# Patient Record
Sex: FEMALE | Race: WHITE | NOT HISPANIC OR LATINO | Employment: FULL TIME | URBAN - METROPOLITAN AREA
[De-identification: names, ages, dates, MRNs, and addresses within clinical notes are randomized per-mention and may not be internally consistent; named-entity substitution may affect disease eponyms.]

---

## 2017-02-15 ENCOUNTER — HOSPITAL ENCOUNTER (OUTPATIENT)
Dept: FAMILY MEDICINE CLINIC | Facility: CLINIC | Age: 32
Setting detail: SPECIMEN
Discharge: HOME | End: 2017-02-15
Attending: FAMILY MEDICINE | Admitting: FAMILY MEDICINE

## 2017-02-15 LAB
ALBUMIN SERPL-MCNC: 3.5 G/DL (ref 3.5–4.8)
ALBUMIN/GLOB SERPL: 0.9 {RATIO} (ref 1–1.7)
ALP SERPL-CCNC: 62 IU/L (ref 32–91)
ALT SERPL-CCNC: 21 IU/L (ref 14–54)
ANION GAP SERPL CALC-SCNC: 10.6 MMOL/L (ref 10–20)
AST SERPL-CCNC: 29 IU/L (ref 15–41)
BASOPHILS # BLD AUTO: 0 10*3/UL (ref 0–0.2)
BASOPHILS NFR BLD AUTO: 1 % (ref 0–2)
BILIRUB SERPL-MCNC: 0.5 MG/DL (ref 0.3–1.2)
BUN SERPL-MCNC: 11 MG/DL (ref 8–20)
BUN/CREAT SERPL: 15.7 (ref 5.4–26.2)
CALCIUM SERPL-MCNC: 9.3 MG/DL (ref 8.9–10.3)
CHLORIDE SERPL-SCNC: 104 MMOL/L (ref 101–111)
CONV CO2: 30 MMOL/L (ref 22–32)
CONV TOTAL PROTEIN: 7.4 G/DL (ref 6.1–7.9)
CREAT UR-MCNC: 0.7 MG/DL (ref 0.4–1)
CRP SERPL-MCNC: 0.63 MG/DL (ref 0–0.7)
DIFFERENTIAL METHOD BLD: (no result)
EOSINOPHIL # BLD AUTO: 0.3 10*3/UL (ref 0–0.3)
EOSINOPHIL # BLD AUTO: 3 % (ref 0–3)
ERYTHROCYTE [DISTWIDTH] IN BLOOD BY AUTOMATED COUNT: 14.4 % (ref 11.5–14.5)
ERYTHROCYTE [SEDIMENTATION RATE] IN BLOOD BY WESTERGREN METHOD: 40 MM/HR (ref 0–20)
GLOBULIN UR ELPH-MCNC: 3.9 G/DL (ref 2.5–3.8)
GLUCOSE SERPL-MCNC: 99 MG/DL (ref 65–99)
HCT VFR BLD AUTO: 38.7 % (ref 35–49)
HGB BLD-MCNC: 12.7 G/DL (ref 12–15)
LYMPHOCYTES # BLD AUTO: 2 10*3/UL (ref 0.8–4.8)
LYMPHOCYTES NFR BLD AUTO: 20 % (ref 18–42)
MCH RBC QN AUTO: 28.5 PG (ref 26–32)
MCHC RBC AUTO-ENTMCNC: 32.7 G/DL (ref 32–36)
MCV RBC AUTO: 87.1 FL (ref 80–94)
MONOCYTES # BLD AUTO: 0.6 10*3/UL (ref 0.1–1.3)
MONOCYTES NFR BLD AUTO: 6 % (ref 2–11)
NEUTROPHILS # BLD AUTO: 7 10*3/UL (ref 2.3–8.6)
NEUTROPHILS NFR BLD AUTO: 70 % (ref 50–75)
NRBC BLD AUTO-RTO: 0 /100{WBCS}
NRBC/RBC NFR BLD MANUAL: 0 10*3/UL
PLATELET # BLD AUTO: 249 10*3/UL (ref 150–450)
PMV BLD AUTO: 10.3 FL (ref 7.4–10.4)
POTASSIUM SERPL-SCNC: 3.6 MMOL/L (ref 3.6–5.1)
RBC # BLD AUTO: 4.44 10*6/UL (ref 4–5.4)
SODIUM SERPL-SCNC: 141 MMOL/L (ref 136–144)
TSH SERPL-ACNC: 0.9 UIU/ML (ref 0.34–5.6)
VIT B12 SERPL-MCNC: 441 PG/ML (ref 180–914)
WBC # BLD AUTO: 9.9 10*3/UL (ref 4.5–11.5)

## 2017-02-17 LAB
ANA SER QL IA: NORMAL
CHROMATIN AB SERPL-ACNC: <20 [IU]/ML (ref 0–20)

## 2018-07-02 ENCOUNTER — HOSPITAL ENCOUNTER (OUTPATIENT)
Dept: FAMILY MEDICINE CLINIC | Facility: CLINIC | Age: 33
Setting detail: SPECIMEN
Discharge: HOME | End: 2018-07-02
Attending: FAMILY MEDICINE | Admitting: FAMILY MEDICINE

## 2018-07-02 LAB
ALBUMIN SERPL-MCNC: 3.5 G/DL (ref 3.5–4.8)
ALBUMIN/GLOB SERPL: 0.9 {RATIO} (ref 1–1.7)
ALP SERPL-CCNC: 59 IU/L (ref 32–91)
ALT SERPL-CCNC: 14 IU/L (ref 14–54)
ANION GAP SERPL CALC-SCNC: 8.9 MMOL/L (ref 10–20)
AST SERPL-CCNC: 18 IU/L (ref 15–41)
BASOPHILS # BLD AUTO: 0 10*3/UL (ref 0–0.2)
BASOPHILS NFR BLD AUTO: 0 % (ref 0–2)
BILIRUB SERPL-MCNC: 0.6 MG/DL (ref 0.3–1.2)
BUN SERPL-MCNC: 10 MG/DL (ref 8–20)
BUN/CREAT SERPL: 16.7 (ref 5.4–26.2)
CALCIUM SERPL-MCNC: 9.1 MG/DL (ref 8.9–10.3)
CHLORIDE SERPL-SCNC: 102 MMOL/L (ref 101–111)
CHOLEST SERPL-MCNC: 116 MG/DL
CHOLEST/HDLC SERPL: 2.5 {RATIO}
CONV CO2: 28 MMOL/L (ref 22–32)
CONV LDL CHOLESTEROL DIRECT: 48 MG/DL (ref 0–100)
CONV TOTAL PROTEIN: 7.6 G/DL (ref 6.1–7.9)
CREAT UR-MCNC: 0.6 MG/DL (ref 0.4–1)
DIFFERENTIAL METHOD BLD: (no result)
EOSINOPHIL # BLD AUTO: 0.2 10*3/UL (ref 0–0.3)
EOSINOPHIL # BLD AUTO: 2 % (ref 0–3)
ERYTHROCYTE [DISTWIDTH] IN BLOOD BY AUTOMATED COUNT: 13.3 % (ref 11.5–14.5)
ERYTHROCYTE [SEDIMENTATION RATE] IN BLOOD BY WESTERGREN METHOD: 45 MM/HR (ref 0–20)
GLOBULIN UR ELPH-MCNC: 4.1 G/DL (ref 2.5–3.8)
GLUCOSE SERPL-MCNC: 83 MG/DL (ref 65–99)
HCT VFR BLD AUTO: 39.1 % (ref 35–49)
HDLC SERPL-MCNC: 46 MG/DL
HGB BLD-MCNC: 12.9 G/DL (ref 12–15)
LDLC/HDLC SERPL: 1 {RATIO}
LIPID INTERPRETATION: ABNORMAL
LYMPHOCYTES # BLD AUTO: 1.4 10*3/UL (ref 0.8–4.8)
LYMPHOCYTES NFR BLD AUTO: 16 % (ref 18–42)
MCH RBC QN AUTO: 29.5 PG (ref 26–32)
MCHC RBC AUTO-ENTMCNC: 32.9 G/DL (ref 32–36)
MCV RBC AUTO: 89.5 FL (ref 80–94)
MONOCYTES # BLD AUTO: 0.4 10*3/UL (ref 0.1–1.3)
MONOCYTES NFR BLD AUTO: 5 % (ref 2–11)
NEUTROPHILS # BLD AUTO: 6.6 10*3/UL (ref 2.3–8.6)
NEUTROPHILS NFR BLD AUTO: 77 % (ref 50–75)
NRBC BLD AUTO-RTO: 0 /100{WBCS}
NRBC/RBC NFR BLD MANUAL: 0 10*3/UL
PLATELET # BLD AUTO: 235 10*3/UL (ref 150–450)
PMV BLD AUTO: 9.6 FL (ref 7.4–10.4)
POTASSIUM SERPL-SCNC: 3.9 MMOL/L (ref 3.6–5.1)
RBC # BLD AUTO: 4.36 10*6/UL (ref 4–5.4)
SODIUM SERPL-SCNC: 135 MMOL/L (ref 136–144)
TRIGL SERPL-MCNC: 113 MG/DL
VLDLC SERPL CALC-MCNC: 21.8 MG/DL
WBC # BLD AUTO: 8.6 10*3/UL (ref 4.5–11.5)

## 2018-09-11 ENCOUNTER — LAB (OUTPATIENT)
Dept: LAB | Facility: HOSPITAL | Age: 33
End: 2018-09-11

## 2018-09-11 ENCOUNTER — OFFICE VISIT (OUTPATIENT)
Dept: ORTHOPEDIC SURGERY | Facility: CLINIC | Age: 33
End: 2018-09-11

## 2018-09-11 VITALS — WEIGHT: 125 LBS | HEIGHT: 63 IN | BODY MASS INDEX: 22.15 KG/M2 | TEMPERATURE: 99 F

## 2018-09-11 DIAGNOSIS — M25.561 CHRONIC PAIN OF RIGHT KNEE: Primary | ICD-10-CM

## 2018-09-11 DIAGNOSIS — M25.561 CHRONIC PAIN OF RIGHT KNEE: ICD-10-CM

## 2018-09-11 DIAGNOSIS — G89.29 CHRONIC PAIN OF RIGHT KNEE: Primary | ICD-10-CM

## 2018-09-11 DIAGNOSIS — G89.29 CHRONIC PAIN OF RIGHT KNEE: ICD-10-CM

## 2018-09-11 LAB
CHROMATIN AB SERPL-ACNC: <10 IU/ML (ref 0–14)
ERYTHROCYTE [SEDIMENTATION RATE] IN BLOOD: 31 MM/HR (ref 0–20)

## 2018-09-11 PROCEDURE — 86431 RHEUMATOID FACTOR QUANT: CPT | Performed by: ORTHOPAEDIC SURGERY

## 2018-09-11 PROCEDURE — 99203 OFFICE O/P NEW LOW 30 MIN: CPT | Performed by: ORTHOPAEDIC SURGERY

## 2018-09-11 PROCEDURE — 73562 X-RAY EXAM OF KNEE 3: CPT | Performed by: ORTHOPAEDIC SURGERY

## 2018-09-11 PROCEDURE — 36415 COLL VENOUS BLD VENIPUNCTURE: CPT

## 2018-09-11 PROCEDURE — 85651 RBC SED RATE NONAUTOMATED: CPT

## 2018-09-11 RX ORDER — LOPERAMIDE HYDROCHLORIDE 2 MG/1
2 TABLET ORAL 4 TIMES DAILY PRN
COMMUNITY

## 2018-09-11 NOTE — PROGRESS NOTES
"Patient: Crystal RANDOLPH  YOB: 1985 33 y.o. female  Medical Record Number: [id number]    Chief Complaints:   Chief Complaint   Patient presents with   • Right Knee - Establish Care       History of Present Illness:Crystal RANDOLPH is a 33 y.o. female who presents with complaints of right knee pain and swelling which is significant.  She has had these symptoms for nearly 4 years and the symptoms are fairly intermittent.  At times she will have severe pain and difficulty bending the kneeand at other times it will calm down to the point where she is able to function fairly normal.  This has worsened over the last few months to the point where the severe symptoms are quite severe.  She denies other constitutional symptoms.  She does have history of Crohn's disease and is on medication for this    Allergies:   Allergies   Allergen Reactions   • Remicade [Infliximab] Anaphylaxis       Medications:   Current Outpatient Prescriptions   Medication Sig Dispense Refill   • loperamide (LOPERAMIDE A-D) 2 MG tablet Take 2 mg by mouth 4 (Four) Times a Day As Needed for Diarrhea.     • Multiple Vitamins-Minerals (MULTI COMPLETE PO) Take  by mouth.       No current facility-administered medications for this visit.          The following portions of the patient's history were reviewed and updated as appropriate: allergies, current medications, past family history, past medical history, past social history, past surgical history and problem list.    Review of Systems:   A 14 point review of systems was performed. All systems negative except pertinent positives/negative listed in HPI above    Physical Exam:   Vitals:    09/11/18 0847   Temp: 99 °F (37.2 °C)   Weight: 56.7 kg (125 lb)   Height: 160 cm (63\")       General: A and O x 3, ASA, NAD    SCLERA:    Normal    DENTITION:   Normal  Knee:  right    ALIGNMENT:     Neutral  ,   Patella tracks   midline    GAIT:    Antalgic    SKIN:    No abnormality    RANGE OF MOTION:   " Painful flexion    STRENGTH:   5 / 5    LIGAMENTS:    No varus / valgus instability.   Negative  Lachman.    MENISCUS:     Positive  medial   Duane       DISTAL PULSES:    Paplable    DISTAL SENSATION :   Intact    LYMPHATICS:     No   lymphadenopathy    OTHER:          - Positive 2+ effusion      - No crepitance with ROM          Radiology:  Xrays 3views right knee  (ap,lateral, sunrise) were ordered and reviewed for evaluation of knee pain demonstrating no abnormality. There are no previous films for comparision.    Assessment/Plan: chronic intermittent swelling and pain in the right knee.  She has a large effusion today.  She also upon questioning stated she had a history of some right wrist soreness which has come and gone.  I'm concerned that she could have a rheumatologic issue at play.  I am going to get an MRI of her knee to rule out mechanical issues.  We'll also order some basic blood work including sedimentation rate and rheumatoid factor and C-reactive protein.  If this workup is negative for mechanical issues within the knee I would send her to a rheumatologist for further evaluation.  She will call back after the MRI and blood work have been performed      Berny Schilling MD  9/11/2018

## 2018-09-19 ENCOUNTER — HOSPITAL ENCOUNTER (OUTPATIENT)
Dept: MRI IMAGING | Facility: HOSPITAL | Age: 33
Discharge: HOME | End: 2018-09-19
Attending: ORTHOPAEDIC SURGERY | Admitting: ORTHOPAEDIC SURGERY

## 2018-09-19 DIAGNOSIS — M25.561 CHRONIC PAIN OF RIGHT KNEE: ICD-10-CM

## 2018-09-19 DIAGNOSIS — G89.29 CHRONIC PAIN OF RIGHT KNEE: ICD-10-CM

## 2018-09-19 PROCEDURE — 73721 MRI JNT OF LWR EXTRE W/O DYE: CPT

## 2018-09-28 ENCOUNTER — TELEPHONE (OUTPATIENT)
Dept: ORTHOPEDIC SURGERY | Facility: CLINIC | Age: 33
End: 2018-09-28

## 2018-09-28 NOTE — TELEPHONE ENCOUNTER
Spoke with patient about MRI results which showed a lot of swelling and about her Labs which were okay as per MLL.  I transferred her to [facility] Kim to set up follow up with [facility] to discuss options

## 2018-10-02 ENCOUNTER — OFFICE VISIT (OUTPATIENT)
Dept: ORTHOPEDIC SURGERY | Facility: CLINIC | Age: 33
End: 2018-10-02

## 2018-10-02 VITALS — HEIGHT: 63 IN | TEMPERATURE: 99 F | BODY MASS INDEX: 22.15 KG/M2 | WEIGHT: 125 LBS

## 2018-10-02 DIAGNOSIS — M25.561 ACUTE PAIN OF RIGHT KNEE: Primary | ICD-10-CM

## 2018-10-02 PROCEDURE — 99213 OFFICE O/P EST LOW 20 MIN: CPT | Performed by: ORTHOPAEDIC SURGERY

## 2018-10-02 NOTE — PROGRESS NOTES
"Patient: Crystal RANDOLPH  YOB: 1985 33 y.o. female  Medical Record Number: [id number]    Chief Complaints:   Chief Complaint   Patient presents with   • Right Knee - Follow-up       History of Present Illness:Crystal RANDOLPH is a 33 y.o. female who presents for follow-up of  right knee MRI she has had a persistent chronic effusion for about 4 years.  She denies any known trauma.  She denies any mechanical symptoms.  The knee is achy or sore she rates as a 3 out of 10.    Allergies:   Allergies   Allergen Reactions   • Remicade [Infliximab] Anaphylaxis       Medications:   Current Outpatient Prescriptions   Medication Sig Dispense Refill   • loperamide (LOPERAMIDE A-D) 2 MG tablet Take 2 mg by mouth 4 (Four) Times a Day As Needed for Diarrhea.     • Multiple Vitamins-Minerals (MULTI COMPLETE PO) Take  by mouth.       No current facility-administered medications for this visit.          The following portions of the patient's history were reviewed and updated as appropriate: allergies, current medications, past family history, past medical history, past social history, past surgical history and problem list.    Review of Systems:   A 14 point review of systems was performed. All systems negative except pertinent positives/negative listed in HPI above    Physical Exam:   Vitals:    10/02/18 1445   Temp: 99 °F (37.2 °C)   TempSrc: Temporal Artery    Weight: 56.7 kg (125 lb)   Height: 160 cm (63\")       General: A and O x 3, ASA, NAD    SCLERA:    Normal    DENTITION:   Normal  Knee shows a 2+ effusion she has good flexion and extension there is no instability there is no significant crepitus with motion the skin is normal appearance the calf is soft is intact to light touch with palpable distal pulses    Radiology: I reviewed her MRI of the right knee both films and report which show what appears to be inflammatory synovitis with synovial thickening and a large effusion but without obvious mechanical " problems    Assessment/Plan:  chronic effusion with MRI documented synovitis without mechanical issues.  I'm going to send her to rheumatology.  I certainly would be happy see her back down the road should we need to consider a synovectomy but we would like to pursueconservative options prior to that.

## 2019-07-06 ENCOUNTER — HOSPITAL ENCOUNTER (OUTPATIENT)
Dept: LABOR AND DELIVERY | Facility: HOSPITAL | Age: 34
Discharge: HOME | End: 2019-07-06
Admitting: OBSTETRICS & GYNECOLOGY

## 2019-07-06 ENCOUNTER — LAB (OUTPATIENT)
Dept: LAB | Facility: HOSPITAL | Age: 34
End: 2019-07-06

## 2019-07-06 DIAGNOSIS — Z98.891 HX OF CESAREAN SECTION: ICD-10-CM

## 2019-07-06 DIAGNOSIS — Z98.891 HX OF CESAREAN SECTION: Primary | ICD-10-CM

## 2019-07-06 LAB
ABO GROUP BLD: NORMAL
AMPHET+METHAMPHET UR QL: NEGATIVE
BARBITURATES UR QL SCN: NORMAL
BENZODIAZ UR QL SCN: NEGATIVE
BLD GP AB SCN SERPL QL: NEGATIVE
CANNABINOIDS SERPL QL: NEGATIVE
COCAINE UR QL: NEGATIVE
CREAT UR-MCNC: 162.5 MG/DL
DEPRECATED RDW RBC AUTO: 65.6 FL (ref 37–54)
EOSINOPHIL # BLD MANUAL: 0.19 10*3/MM3 (ref 0–0.4)
EOSINOPHIL NFR BLD MANUAL: 2 % (ref 0.3–6.2)
ERYTHROCYTE [DISTWIDTH] IN BLOOD BY AUTOMATED COUNT: 21.8 % (ref 12.3–15.4)
HCT VFR BLD AUTO: 36.4 % (ref 34–46.6)
HGB BLD-MCNC: 12.2 G/DL (ref 12–15.9)
HIV1+2 AB SER QL: NEGATIVE
LYMPHOCYTES # BLD MANUAL: 1.15 10*3/MM3 (ref 0.7–3.1)
LYMPHOCYTES NFR BLD MANUAL: 1 % (ref 5–12)
LYMPHOCYTES NFR BLD MANUAL: 12 % (ref 19.6–45.3)
MCH RBC QN AUTO: 29.7 PG (ref 26.6–33)
MCHC RBC AUTO-ENTMCNC: 33.4 G/DL (ref 31.5–35.7)
MCV RBC AUTO: 88.9 FL (ref 79–97)
METHADONE UR QL SCN: NEGATIVE
MONOCYTES # BLD AUTO: 0.1 10*3/MM3 (ref 0.1–0.9)
NEUTROPHILS # BLD AUTO: 8.16 10*3/MM3 (ref 1.7–7)
NEUTROPHILS NFR BLD MANUAL: 80 % (ref 42.7–76)
NEUTS BAND NFR BLD MANUAL: 5 % (ref 0–5)
OPIATES UR QL: NEGATIVE
PCP UR QL SCN: NEGATIVE
PLAT MORPH BLD: NORMAL
PLATELET # BLD AUTO: 172 10*3/MM3 (ref 140–450)
PMV BLD AUTO: 9.2 FL (ref 6–12)
POLYCHROMASIA BLD QL SMEAR: ABNORMAL
RBC # BLD AUTO: 4.1 10*6/MM3 (ref 3.77–5.28)
RH BLD: POSITIVE
SCAN SLIDE: NORMAL
T&S EXPIRATION DATE: NORMAL
WBC MORPH BLD: NORMAL
WBC NRBC COR # BLD: 9.6 10*3/MM3 (ref 3.4–10.8)

## 2019-07-06 PROCEDURE — 36415 COLL VENOUS BLD VENIPUNCTURE: CPT

## 2019-07-06 PROCEDURE — 80307 DRUG TEST PRSMV CHEM ANLYZR: CPT

## 2019-07-06 PROCEDURE — 86901 BLOOD TYPING SEROLOGIC RH(D): CPT | Performed by: OBSTETRICS & GYNECOLOGY

## 2019-07-06 PROCEDURE — 86900 BLOOD TYPING SEROLOGIC ABO: CPT | Performed by: OBSTETRICS & GYNECOLOGY

## 2019-07-06 PROCEDURE — 86850 RBC ANTIBODY SCREEN: CPT | Performed by: OBSTETRICS & GYNECOLOGY

## 2019-07-06 PROCEDURE — 86900 BLOOD TYPING SEROLOGIC ABO: CPT

## 2019-07-06 PROCEDURE — 85007 BL SMEAR W/DIFF WBC COUNT: CPT

## 2019-07-06 PROCEDURE — G0432 EIA HIV-1/HIV-2 SCREEN: HCPCS

## 2019-07-06 PROCEDURE — 86901 BLOOD TYPING SEROLOGIC RH(D): CPT

## 2019-07-06 PROCEDURE — 85025 COMPLETE CBC W/AUTO DIFF WBC: CPT

## 2019-07-06 PROCEDURE — 82570 ASSAY OF URINE CREATININE: CPT

## 2019-07-06 PROCEDURE — 86780 TREPONEMA PALLIDUM: CPT

## 2019-07-08 ENCOUNTER — APPOINTMENT (OUTPATIENT)
Dept: CT IMAGING | Facility: HOSPITAL | Age: 34
End: 2019-07-08

## 2019-07-08 ENCOUNTER — ANESTHESIA (OUTPATIENT)
Dept: LABOR AND DELIVERY | Facility: HOSPITAL | Age: 34
End: 2019-07-08

## 2019-07-08 ENCOUNTER — HOSPITAL ENCOUNTER (INPATIENT)
Facility: HOSPITAL | Age: 34
LOS: 3 days | Discharge: HOME | End: 2019-07-11
Attending: OBSTETRICS & GYNECOLOGY | Admitting: OBSTETRICS & GYNECOLOGY

## 2019-07-08 ENCOUNTER — ANESTHESIA EVENT (OUTPATIENT)
Dept: LABOR AND DELIVERY | Facility: HOSPITAL | Age: 34
End: 2019-07-08

## 2019-07-08 PROCEDURE — 74178 CT ABD&PLV WO CNTR FLWD CNTR: CPT

## 2019-07-08 PROCEDURE — 25010000002 PHENYLEPHRINE 10 MG/ML SOLUTION: Performed by: ANESTHESIOLOGY

## 2019-07-08 PROCEDURE — 25010000003 CEFAZOLIN PER 500 MG: Performed by: OBSTETRICS & GYNECOLOGY

## 2019-07-08 PROCEDURE — 25010000002 ONDANSETRON PER 1 MG: Performed by: ANESTHESIOLOGY

## 2019-07-08 PROCEDURE — 94799 UNLISTED PULMONARY SVC/PX: CPT

## 2019-07-08 PROCEDURE — 25010000002 MORPHINE PER 10 MG: Performed by: ANESTHESIOLOGY

## 2019-07-08 PROCEDURE — 0 IOPAMIDOL PER 1 ML: Performed by: OBSTETRICS & GYNECOLOGY

## 2019-07-08 PROCEDURE — 25010000002 KETOROLAC TROMETHAMINE PER 15 MG: Performed by: ANESTHESIOLOGY

## 2019-07-08 RX ORDER — PROMETHAZINE HYDROCHLORIDE 25 MG/1
25 SUPPOSITORY RECTAL ONCE AS NEEDED
Status: DISCONTINUED | OUTPATIENT
Start: 2019-07-08 | End: 2019-07-11 | Stop reason: HOSPADM

## 2019-07-08 RX ORDER — METHYLERGONOVINE MALEATE 0.2 MG/ML
200 INJECTION INTRAVENOUS ONCE AS NEEDED
Status: DISCONTINUED | OUTPATIENT
Start: 2019-07-08 | End: 2019-07-11 | Stop reason: HOSPADM

## 2019-07-08 RX ORDER — KETOROLAC TROMETHAMINE 30 MG/ML
30 INJECTION, SOLUTION INTRAMUSCULAR; INTRAVENOUS EVERY 6 HOURS PRN
Status: DISPENSED | OUTPATIENT
Start: 2019-07-08 | End: 2019-07-09

## 2019-07-08 RX ORDER — PRENATAL VIT/IRON FUM/FOLIC AC 27MG-0.8MG
1 TABLET ORAL DAILY
Status: DISCONTINUED | OUTPATIENT
Start: 2019-07-08 | End: 2019-07-11 | Stop reason: HOSPADM

## 2019-07-08 RX ORDER — MORPHINE SULFATE 1 MG/ML
INJECTION, SOLUTION EPIDURAL; INTRATHECAL; INTRAVENOUS AS NEEDED
Status: DISCONTINUED | OUTPATIENT
Start: 2019-07-08 | End: 2019-07-08

## 2019-07-08 RX ORDER — LIDOCAINE HYDROCHLORIDE 10 MG/ML
5 INJECTION, SOLUTION EPIDURAL; INFILTRATION; INTRACAUDAL; PERINEURAL AS NEEDED
Status: DISCONTINUED | OUTPATIENT
Start: 2019-07-08 | End: 2019-07-08 | Stop reason: HOSPADM

## 2019-07-08 RX ORDER — ACETAMINOPHEN 325 MG/1
650 TABLET ORAL EVERY 4 HOURS PRN
Status: ACTIVE | OUTPATIENT
Start: 2019-07-08 | End: 2019-07-09

## 2019-07-08 RX ORDER — DEXAMETHASONE SODIUM PHOSPHATE 4 MG/ML
8 INJECTION, SOLUTION INTRA-ARTICULAR; INTRALESIONAL; INTRAMUSCULAR; INTRAVENOUS; SOFT TISSUE ONCE AS NEEDED
Status: DISCONTINUED | OUTPATIENT
Start: 2019-07-08 | End: 2019-07-11 | Stop reason: HOSPADM

## 2019-07-08 RX ORDER — OXYTOCIN-SODIUM CHLORIDE 0.9% IV SOLN 30 UNIT/500ML 30-0.9/5 UT/ML-%
999 SOLUTION INTRAVENOUS ONCE
Status: DISCONTINUED | OUTPATIENT
Start: 2019-07-08 | End: 2019-07-11 | Stop reason: HOSPADM

## 2019-07-08 RX ORDER — MORPHINE SULFATE 4 MG/ML
2 INJECTION, SOLUTION INTRAMUSCULAR; INTRAVENOUS
Status: ACTIVE | OUTPATIENT
Start: 2019-07-08 | End: 2019-07-09

## 2019-07-08 RX ORDER — BUPIVACAINE HYDROCHLORIDE 7.5 MG/ML
INJECTION, SOLUTION EPIDURAL; RETROBULBAR
Status: DISCONTINUED | OUTPATIENT
Start: 2019-07-08 | End: 2019-07-08 | Stop reason: SURG

## 2019-07-08 RX ORDER — OXYCODONE HYDROCHLORIDE 5 MG/1
10 TABLET ORAL EVERY 4 HOURS PRN
Status: DISCONTINUED | OUTPATIENT
Start: 2019-07-08 | End: 2019-07-11 | Stop reason: HOSPADM

## 2019-07-08 RX ORDER — SODIUM CHLORIDE, SODIUM LACTATE, POTASSIUM CHLORIDE, CALCIUM CHLORIDE 600; 310; 30; 20 MG/100ML; MG/100ML; MG/100ML; MG/100ML
125 INJECTION, SOLUTION INTRAVENOUS CONTINUOUS
Status: DISCONTINUED | OUTPATIENT
Start: 2019-07-08 | End: 2019-07-08

## 2019-07-08 RX ORDER — ACETAMINOPHEN 650 MG/1
650 SUPPOSITORY RECTAL ONCE AS NEEDED
Status: DISCONTINUED | OUTPATIENT
Start: 2019-07-08 | End: 2019-07-11 | Stop reason: HOSPADM

## 2019-07-08 RX ORDER — TRISODIUM CITRATE DIHYDRATE AND CITRIC ACID MONOHYDRATE 500; 334 MG/5ML; MG/5ML
30 SOLUTION ORAL ONCE
Status: COMPLETED | OUTPATIENT
Start: 2019-07-08 | End: 2019-07-08

## 2019-07-08 RX ORDER — HYDROXYZINE HYDROCHLORIDE 25 MG/1
50 TABLET, FILM COATED ORAL EVERY 6 HOURS PRN
Status: DISCONTINUED | OUTPATIENT
Start: 2019-07-08 | End: 2019-07-11 | Stop reason: HOSPADM

## 2019-07-08 RX ORDER — SODIUM CHLORIDE, SODIUM LACTATE, POTASSIUM CHLORIDE, CALCIUM CHLORIDE 600; 310; 30; 20 MG/100ML; MG/100ML; MG/100ML; MG/100ML
125 INJECTION, SOLUTION INTRAVENOUS CONTINUOUS
Status: DISCONTINUED | OUTPATIENT
Start: 2019-07-08 | End: 2019-07-11 | Stop reason: HOSPADM

## 2019-07-08 RX ORDER — ONDANSETRON 4 MG/1
8 TABLET, FILM COATED ORAL EVERY 8 HOURS PRN
Status: DISCONTINUED | OUTPATIENT
Start: 2019-07-08 | End: 2019-07-11 | Stop reason: HOSPADM

## 2019-07-08 RX ORDER — ONDANSETRON 2 MG/ML
4 INJECTION INTRAMUSCULAR; INTRAVENOUS ONCE AS NEEDED
Status: DISCONTINUED | OUTPATIENT
Start: 2019-07-08 | End: 2019-07-11 | Stop reason: HOSPADM

## 2019-07-08 RX ORDER — OXYTOCIN-SODIUM CHLORIDE 0.9% IV SOLN 30 UNIT/500ML 30-0.9/5 UT/ML-%
250 SOLUTION INTRAVENOUS CONTINUOUS
Status: ACTIVE | OUTPATIENT
Start: 2019-07-08 | End: 2019-07-08

## 2019-07-08 RX ORDER — MISOPROSTOL 200 UG/1
800 TABLET ORAL AS NEEDED
Status: DISCONTINUED | OUTPATIENT
Start: 2019-07-08 | End: 2019-07-11 | Stop reason: HOSPADM

## 2019-07-08 RX ORDER — PROMETHAZINE HYDROCHLORIDE 25 MG/ML
6.25 INJECTION, SOLUTION INTRAMUSCULAR; INTRAVENOUS ONCE AS NEEDED
Status: DISCONTINUED | OUTPATIENT
Start: 2019-07-08 | End: 2019-07-11 | Stop reason: HOSPADM

## 2019-07-08 RX ORDER — ACETAMINOPHEN 325 MG/1
650 TABLET ORAL ONCE AS NEEDED
Status: DISCONTINUED | OUTPATIENT
Start: 2019-07-08 | End: 2019-07-11 | Stop reason: HOSPADM

## 2019-07-08 RX ORDER — MORPHINE SULFATE 4 MG/ML
INJECTION, SOLUTION INTRAMUSCULAR; INTRAVENOUS
Status: COMPLETED | OUTPATIENT
Start: 2019-07-08 | End: 2019-07-08

## 2019-07-08 RX ORDER — HYDROCODONE BITARTRATE AND ACETAMINOPHEN 5; 325 MG/1; MG/1
1 TABLET ORAL EVERY 4 HOURS PRN
Status: DISCONTINUED | OUTPATIENT
Start: 2019-07-08 | End: 2019-07-11 | Stop reason: HOSPADM

## 2019-07-08 RX ORDER — ONDANSETRON 2 MG/ML
8 INJECTION INTRAMUSCULAR; INTRAVENOUS EVERY 8 HOURS PRN
Status: DISCONTINUED | OUTPATIENT
Start: 2019-07-08 | End: 2019-07-11 | Stop reason: HOSPADM

## 2019-07-08 RX ORDER — PROMETHAZINE HYDROCHLORIDE 25 MG/1
25 TABLET ORAL ONCE AS NEEDED
Status: DISCONTINUED | OUTPATIENT
Start: 2019-07-08 | End: 2019-07-11 | Stop reason: HOSPADM

## 2019-07-08 RX ORDER — DOCUSATE SODIUM 100 MG/1
100 CAPSULE, LIQUID FILLED ORAL 2 TIMES DAILY
Status: DISCONTINUED | OUTPATIENT
Start: 2019-07-08 | End: 2019-07-11 | Stop reason: HOSPADM

## 2019-07-08 RX ORDER — LANOLIN 100 %
OINTMENT (GRAM) TOPICAL
Status: DISCONTINUED | OUTPATIENT
Start: 2019-07-08 | End: 2019-07-11 | Stop reason: HOSPADM

## 2019-07-08 RX ORDER — ONDANSETRON 2 MG/ML
INJECTION INTRAMUSCULAR; INTRAVENOUS AS NEEDED
Status: DISCONTINUED | OUTPATIENT
Start: 2019-07-08 | End: 2019-07-08 | Stop reason: SURG

## 2019-07-08 RX ORDER — SODIUM CHLORIDE 0.9 % (FLUSH) 0.9 %
3-10 SYRINGE (ML) INJECTION AS NEEDED
Status: DISCONTINUED | OUTPATIENT
Start: 2019-07-08 | End: 2019-07-08 | Stop reason: HOSPADM

## 2019-07-08 RX ORDER — KETOROLAC TROMETHAMINE 30 MG/ML
30 INJECTION, SOLUTION INTRAMUSCULAR; INTRAVENOUS EVERY 6 HOURS
Status: DISCONTINUED | OUTPATIENT
Start: 2019-07-08 | End: 2019-07-08

## 2019-07-08 RX ORDER — CARBOPROST TROMETHAMINE 250 UG/ML
250 INJECTION, SOLUTION INTRAMUSCULAR AS NEEDED
Status: DISCONTINUED | OUTPATIENT
Start: 2019-07-08 | End: 2019-07-11 | Stop reason: HOSPADM

## 2019-07-08 RX ORDER — OXYTOCIN-SODIUM CHLORIDE 0.9% IV SOLN 30 UNIT/500ML 30-0.9/5 UT/ML-%
125 SOLUTION INTRAVENOUS CONTINUOUS PRN
Status: DISCONTINUED | OUTPATIENT
Start: 2019-07-08 | End: 2019-07-11 | Stop reason: HOSPADM

## 2019-07-08 RX ORDER — OXYTOCIN 10 [USP'U]/ML
INJECTION, SOLUTION INTRAMUSCULAR; INTRAVENOUS AS NEEDED
Status: DISCONTINUED | OUTPATIENT
Start: 2019-07-08 | End: 2019-07-08 | Stop reason: SURG

## 2019-07-08 RX ORDER — MORPHINE SULFATE 4 MG/ML
2 INJECTION, SOLUTION INTRAMUSCULAR; INTRAVENOUS
Status: ACTIVE | OUTPATIENT
Start: 2019-07-08 | End: 2019-07-08

## 2019-07-08 RX ORDER — PHENYLEPHRINE HYDROCHLORIDE 10 MG/ML
INJECTION INTRAVENOUS AS NEEDED
Status: DISCONTINUED | OUTPATIENT
Start: 2019-07-08 | End: 2019-07-08 | Stop reason: SURG

## 2019-07-08 RX ORDER — SODIUM CHLORIDE 0.9 % (FLUSH) 0.9 %
3 SYRINGE (ML) INJECTION EVERY 12 HOURS SCHEDULED
Status: DISCONTINUED | OUTPATIENT
Start: 2019-07-08 | End: 2019-07-08 | Stop reason: HOSPADM

## 2019-07-08 RX ORDER — CALCIUM CARBONATE 200(500)MG
2 TABLET,CHEWABLE ORAL EVERY 6 HOURS PRN
Status: DISCONTINUED | OUTPATIENT
Start: 2019-07-08 | End: 2019-07-11 | Stop reason: HOSPADM

## 2019-07-08 RX ADMIN — MORPHINE SULFATE 0.3 MG: 4 INJECTION INTRAVENOUS at 07:50

## 2019-07-08 RX ADMIN — CEFAZOLIN 2 G: 1 INJECTION, POWDER, FOR SOLUTION INTRAMUSCULAR; INTRAVENOUS at 07:37

## 2019-07-08 RX ADMIN — IOPAMIDOL 100 ML: 755 INJECTION, SOLUTION INTRAVENOUS at 13:45

## 2019-07-08 RX ADMIN — PHENYLEPHRINE HYDROCHLORIDE 100 MCG: 10 INJECTION INTRAVENOUS at 08:00

## 2019-07-08 RX ADMIN — SODIUM CHLORIDE, SODIUM LACTATE, POTASSIUM CHLORIDE, AND CALCIUM CHLORIDE: .6; .31; .03; .02 INJECTION, SOLUTION INTRAVENOUS at 08:35

## 2019-07-08 RX ADMIN — ONDANSETRON 4 MG: 2 INJECTION INTRAMUSCULAR; INTRAVENOUS at 08:00

## 2019-07-08 RX ADMIN — SODIUM CHLORIDE, SODIUM LACTATE, POTASSIUM CHLORIDE, AND CALCIUM CHLORIDE 999 ML: .6; .31; .03; .02 INJECTION, SOLUTION INTRAVENOUS at 05:45

## 2019-07-08 RX ADMIN — SODIUM CITRATE AND CITRIC ACID MONOHYDRATE 30 ML: 500; 334 SOLUTION ORAL at 07:37

## 2019-07-08 RX ADMIN — KETOROLAC TROMETHAMINE 30 MG: 30 INJECTION, SOLUTION INTRAMUSCULAR at 21:15

## 2019-07-08 RX ADMIN — BUPIVACAINE HYDROCHLORIDE 1.8 ML: 7.5 INJECTION, SOLUTION EPIDURAL; RETROBULBAR at 07:50

## 2019-07-08 RX ADMIN — OXYTOCIN 10 UNITS: 10 INJECTION INTRAVENOUS at 08:13

## 2019-07-08 RX ADMIN — PHENYLEPHRINE HYDROCHLORIDE 50 MCG: 10 INJECTION INTRAVENOUS at 08:20

## 2019-07-08 RX ADMIN — SODIUM CHLORIDE, SODIUM LACTATE, POTASSIUM CHLORIDE, AND CALCIUM CHLORIDE 125 ML/HR: .6; .31; .03; .02 INJECTION, SOLUTION INTRAVENOUS at 06:50

## 2019-07-08 RX ADMIN — KETOROLAC TROMETHAMINE 30 MG: 30 INJECTION, SOLUTION INTRAMUSCULAR at 13:43

## 2019-07-08 NOTE — L&D DELIVERY NOTE
[facility]   Section Operative Note    Pre-Operative Dx:   Term pregnancy.  Prior  section         Postoperative dx:    Same     Procedure: Repeat    Surgeon/Assistant: Enoc Fall MD\Jakub   Anesthesia:  Anesthesiologist:  Spinal  Wayne   EBL:  600     Antibiotics: cefazolin (Ancef)     Findings:  Obliteration of the anterior cul-de-sac   Infant: VFI      Apgars:  8 and 9 at 1 and 5 minutes.           Indications:  Prior     Procedure Details:   2 g of Kefzol preoperatively in holding.  Ostomy bag was isolated with a Tegaderm drape.  Abdomen was prepped and draped    Pfannenstiel incision was used.  It was noted to be just 1 cm superior to the symphysis.  The muscles were split in the midline.  The bladder was noted to be pulled up over the anterior surface of the lower uterine segment.  We were unable to enter the peritoneal cavity  A bladder blade was placed.  The bladder was noted to be inferior to the desired incision on the uterus.  A low transverse uterine incision was made to affect delivery    Viable infant female was delivered without difficulty.  The infant had good color tone and cry at delivery and was handed to waiting nursery personnel    Placenta delivered spontaneously intact.  The uterus could not be exteriorized and wounds inspected and noted to be free of debris within the abdominal cavity.  The uterine incision was repaired with 0 Monocryl in a running interlocking fashion.  There was good hemostasis noted along the incision    The bladder was noted to be intact however is very thin with the Gonzalez bulb being visible through the bladder epithelium.  Interrupted 3-0 Monocryl sutures were placed to imbricate the thin area of the dome of the bladder    The muscles were then imbricated in the midline with 0 Monocryl sutures.  The fascia was repaired with 0 Vicryl in a running interlocking manner from side to middle.  Subcutaneous tissue was closed with 3-0  Monocryl in a running manner.  The skin was stapled closed    All layers were irrigated and inspected for hemostasis prior to the closure.  Final counts were correct.  A postoperative IVP with voiding cystogram will be performed       Complications:   None      Disposition:   Mother to Mother Baby/Postpartum  in stable condition currently.   Baby to NBN  in stable condition currently.       Enoc Fall MD  7/8/2019  8:39 AM

## 2019-07-08 NOTE — ANESTHESIA PROCEDURE NOTES
Spinal Block      Patient reassessed immediately prior to procedure    Patient location during procedure: OR  Start Time: 7/8/2019 7:50 AM  Performed By  Anesthesiologist: Thai Black MD  Preanesthetic Checklist  Completed: patient identified, site marked, surgical consent, pre-op evaluation, timeout performed, IV checked, risks and benefits discussed and monitors and equipment checked  Spinal Block Prep:  Patient Position:sitting  Sterile Tech:cap, gloves, gown, mask and sterile barriers  Prep:Betadine  Patient Monitoring:EKG, continuous pulse oximetry and blood pressure monitoring  Spinal Block Procedure  Approach:midline  Guidance:landmark technique and palpation technique  Location:L4-L5  Needle Type:Sprotte  Needle Gauge:25 G  Placement of Spinal needle event:cerebrospinal fluid aspirated  Paresthesia: no  Fluid Appearance:clear  Medications: Morphine sulfate (PF) injection, 0.3 mg  bupivacaine PF (MARCAINE) injection 0.75%, 1.8 mL  Med Administered at 7/8/2019 7:50 AM   Post Assessment  Patient Tolerance:patient tolerated the procedure well with no apparent complications  Complications no

## 2019-07-08 NOTE — PLAN OF CARE
Problem: Patient Care Overview  Goal: Discharge Needs Assessment  Outcome: Ongoing (interventions implemented as appropriate)    Goal: Interprofessional Rounds/Family Conf  Outcome: Ongoing (interventions implemented as appropriate)      Problem: Postpartum ( Delivery) (Adult,Obstetrics,Pediatric)  Goal: Signs and Symptoms of Listed Potential Problems Will be Absent, Minimized or Managed (Postpartum)  Outcome: Outcome(s) achieved Date Met: 19    Goal: Anesthesia/Sedation Recovery  Outcome: Ongoing (interventions implemented as appropriate)

## 2019-07-08 NOTE — ANESTHESIA POSTPROCEDURE EVALUATION
Patient: Crystal Hodge    Procedure Summary     Date:  19 Room / Location:  [facility] LABOR DELIVERY  [facility] LABOR DELIVERY    Anesthesia Start:  745 Anesthesia Stop:  841    Procedure:   SECTION PRIMARY (N/A Abdomen) Diagnosis:  (PREVIOUS )    Surgeon:  Enoc Fall MD Provider:  Thai Black MD    Anesthesia Type:  spinal ASA Status:  2          Anesthesia Type: spinal  Last vitals  BP   118/90 (19)   Temp   98.5 °F (36.9 °C) (19)   Pulse   97 (19)   Resp   18 (19)     SpO2   97 % (19)     Post Anesthesia Care and Evaluation    Patient location during evaluation: PACU  Patient participation: complete - patient participated  Level of consciousness: awake  Pain scale: See nurse's notes for pain score.  Pain management: adequate  Airway patency: patent  Anesthetic complications: No anesthetic complications  PONV Status: none  Cardiovascular status: acceptable  Respiratory status: acceptable  Hydration status: acceptable    Comments: Patient seen and examined postoperatively; vital signs stable; SpO2 greater than or equal to 90%; cardiopulmonary status stable; nausea/vomiting adequately controlled; pain adequately controlled; no apparent anesthesia complications; patient discharged from anesthesia care when discharge criteria were met

## 2019-07-08 NOTE — ANESTHESIA PREPROCEDURE EVALUATION
Anesthesia Evaluation     Patient summary reviewed and Nursing notes reviewed   NPO Solid Status: > 8 hours  NPO Liquid Status: > 8 hours           Airway   Mallampati: I  TM distance: >3 FB  Neck ROM: full  No difficulty expected  Dental - normal exam     Pulmonary - negative pulmonary ROS and normal exam   Cardiovascular - negative cardio ROS and normal exam        Neuro/Psych- negative ROS  GI/Hepatic/Renal/Endo - negative ROS     Musculoskeletal (-) negative ROS    Abdominal  - normal exam    Bowel sounds: normal.   Substance History - negative use     OB/GYN negative ob/gyn ROS         Other                        Anesthesia Plan    ASA 2     spinal     Anesthetic plan, all risks, benefits, and alternatives have been provided, discussed and informed consent has been obtained with: patient.

## 2019-07-08 NOTE — PLAN OF CARE
Problem: Patient Care Overview  Goal: Plan of Care Review  Outcome: Ongoing (interventions implemented as appropriate)    Goal: Individualization and Mutuality  Outcome: Ongoing (interventions implemented as appropriate)    Goal: Discharge Needs Assessment  Outcome: Ongoing (interventions implemented as appropriate)    Goal: Interprofessional Rounds/Family Conf  Outcome: Ongoing (interventions implemented as appropriate)      Problem: Postpartum ( Delivery) (Adult,Obstetrics,Pediatric)  Goal: Signs and Symptoms of Listed Potential Problems Will be Absent, Minimized or Managed (Postpartum)  Outcome: Ongoing (interventions implemented as appropriate)    Goal: Anesthesia/Sedation Recovery  Outcome: Ongoing (interventions implemented as appropriate)

## 2019-07-08 NOTE — ADDENDUM NOTE
Addendum  created 07/08/19 1503 by Thai Black MD    Diagnosis association updated, Intraprocedure Blocks edited, Sign clinical note

## 2019-07-08 NOTE — H&P
39-week intrauterine pregnancy.  Prior  delivery.  Presents for repeat  section    Fetal heart tones are category 1.  Prenatal care has been uncomplicated

## 2019-07-09 LAB
BASOPHILS # BLD AUTO: 0 10*3/MM3 (ref 0–0.2)
BASOPHILS NFR BLD AUTO: 0.3 % (ref 0–1.5)
DEPRECATED RDW RBC AUTO: 66.5 FL (ref 37–54)
EOSINOPHIL # BLD AUTO: 0.3 10*3/MM3 (ref 0–0.4)
EOSINOPHIL NFR BLD AUTO: 3.3 % (ref 0.3–6.2)
ERYTHROCYTE [DISTWIDTH] IN BLOOD BY AUTOMATED COUNT: 22.1 % (ref 12.3–15.4)
HCT VFR BLD AUTO: 34.2 % (ref 34–46.6)
HGB BLD-MCNC: 11.1 G/DL (ref 12–15.9)
LYMPHOCYTES # BLD AUTO: 1 10*3/MM3 (ref 0.7–3.1)
LYMPHOCYTES NFR BLD AUTO: 9.8 % (ref 19.6–45.3)
MCH RBC QN AUTO: 29.5 PG (ref 26.6–33)
MCHC RBC AUTO-ENTMCNC: 32.6 G/DL (ref 31.5–35.7)
MCV RBC AUTO: 90.7 FL (ref 79–97)
MONOCYTES # BLD AUTO: 0.7 10*3/MM3 (ref 0.1–0.9)
MONOCYTES NFR BLD AUTO: 6.6 % (ref 5–12)
NEUTROPHILS # BLD AUTO: 8.2 10*3/MM3 (ref 1.7–7)
NEUTROPHILS NFR BLD AUTO: 80 % (ref 42.7–76)
NRBC BLD AUTO-RTO: 0 /100 WBC (ref 0–0.2)
PLATELET # BLD AUTO: 157 10*3/MM3 (ref 140–450)
PMV BLD AUTO: 9.8 FL (ref 6–12)
RBC # BLD AUTO: 3.77 10*6/MM3 (ref 3.77–5.28)
WBC NRBC COR # BLD: 10.3 10*3/MM3 (ref 3.4–10.8)

## 2019-07-09 PROCEDURE — 85025 COMPLETE CBC W/AUTO DIFF WBC: CPT | Performed by: OBSTETRICS & GYNECOLOGY

## 2019-07-09 PROCEDURE — 90715 TDAP VACCINE 7 YRS/> IM: CPT | Performed by: OBSTETRICS & GYNECOLOGY

## 2019-07-09 PROCEDURE — 25010000002 TDAP 5-2.5-18.5 LF-MCG/0.5 SUSPENSION: Performed by: OBSTETRICS & GYNECOLOGY

## 2019-07-09 PROCEDURE — 90471 IMMUNIZATION ADMIN: CPT | Performed by: OBSTETRICS & GYNECOLOGY

## 2019-07-09 RX ORDER — IBUPROFEN 600 MG/1
600 TABLET ORAL EVERY 6 HOURS PRN
Status: DISCONTINUED | OUTPATIENT
Start: 2019-07-09 | End: 2019-07-11 | Stop reason: HOSPADM

## 2019-07-09 RX ADMIN — HYDROCODONE BITARTRATE AND ACETAMINOPHEN 1 TABLET: 5; 325 TABLET ORAL at 15:12

## 2019-07-09 RX ADMIN — HYDROCODONE BITARTRATE AND ACETAMINOPHEN 1 TABLET: 5; 325 TABLET ORAL at 08:29

## 2019-07-09 RX ADMIN — HYDROCODONE BITARTRATE AND ACETAMINOPHEN 1 TABLET: 5; 325 TABLET ORAL at 21:18

## 2019-07-09 RX ADMIN — IBUPROFEN 600 MG: 600 TABLET, FILM COATED ORAL at 08:30

## 2019-07-09 RX ADMIN — IBUPROFEN 600 MG: 600 TABLET, FILM COATED ORAL at 21:18

## 2019-07-09 RX ADMIN — TETANUS TOXOID, REDUCED DIPHTHERIA TOXOID AND ACELLULAR PERTUSSIS VACCINE, ADSORBED 0.5 ML: 5; 2.5; 8; 8; 2.5 SUSPENSION INTRAMUSCULAR at 08:31

## 2019-07-09 RX ADMIN — IBUPROFEN 600 MG: 600 TABLET, FILM COATED ORAL at 15:12

## 2019-07-09 NOTE — PAYOR COMM NOTE
"Inpt maternity admit: Crystal Randolph, auth# [id number]    AUTHORIZATION PENDING:   PLEASE CALL OR FAX DETERMINATION TO CONTACT BELOW. THANK YOU.     LOUISA CHILDS RN  UTILIZATION REVIEW  [facility]  PH: [phone]  FX: [phone]    Crystal Randolph (34 y.o. Female)     Date of Birth Social Security Number Address Home Phone MRN    1985  [id number] [place] [address] [phone] [id number]    [employer] Status          None        Admission Date Admission Type Admitting Provider Attending Provider Department, Room/Bed    19 Elective Enoc Fall MD Baldwin, Stephen M, MD [facility] MOTHER BABY, M418/1    Discharge Date Discharge Disposition Discharge Destination                       Attending Provider:  Enoc Fall MD    Allergies:  Remicade [Infliximab]    Isolation:  None   Infection:  None   Code Status:  CPR    Ht:  160 cm (63\")   Wt:  71 kg (156 lb 8.4 oz)    Admission Cmt:  None   Principal Problem:  None                Active Insurance as of 2019     Primary Coverage     Payor Plan Insurance Group Employer/Plan Group    [facility] [id number]     Payor Plan Address Payor Plan Phone Number Payor Plan Fax Number Effective Dates    [address]   2018 - None Entered    [facility] [id number]       Subscriber Name Subscriber Birth Date Member ID       JIMMY RANDOLPH 1984 [id number]                 Emergency Contacts      (Rel.) Home Phone Work Phone Mobile Phone    Jimmy Randolph (Spouse) [phone] -- [phone]               History & Physical      Enoc Fall MD at 2019  7:29 AM        39-week intrauterine pregnancy.  Prior  delivery.  Presents for repeat  section    Fetal heart tones are category 1.  Prenatal care has been uncomplicated    Electronically signed by Enoc Fall MD at 2019  7:30 AM          Operative/Procedure Notes (last " 72 hours) (Notes from 2019  2:28 PM through 2019  2:28 PM)      Enoc Fall MD at 2019  8:38 AM          [facility]   Section Operative Note    Pre-Operative Dx:   Term pregnancy.  Prior  section         Postoperative dx:    Same     Procedure: Repeat    Surgeon/Assistant: Enoc Fall MD\Jakub   Anesthesia:  Anesthesiologist:  Spinal  Wayne   EBL:  600     Antibiotics: cefazolin (Ancef)     Findings:  Obliteration of the anterior cul-de-sac   Infant: VFI      Apgars:  8 and 9 at 1 and 5 minutes.           Indications:  Prior     Procedure Details:   2 g of Kefzol preoperatively in holding.  Ostomy bag was isolated with a Tegaderm drape.  Abdomen was prepped and draped    Pfannenstiel incision was used.  It was noted to be just 1 cm superior to the symphysis.  The muscles were split in the midline.  The bladder was noted to be pulled up over the anterior surface of the lower uterine segment.  We were unable to enter the peritoneal cavity  A bladder blade was placed.  The bladder was noted to be inferior to the desired incision on the uterus.  A low transverse uterine incision was made to affect delivery    Viable infant female was delivered without difficulty.  The infant had good color tone and cry at delivery and was handed to waiting nursery personnel    Placenta delivered spontaneously intact.  The uterus could not be exteriorized and wounds inspected and noted to be free of debris within the abdominal cavity.  The uterine incision was repaired with 0 Monocryl in a running interlocking fashion.  There was good hemostasis noted along the incision    The bladder was noted to be intact however is very thin with the Gonzalez bulb being visible through the bladder epithelium.  Interrupted 3-0 Monocryl sutures were placed to imbricate the thin area of the dome of the bladder    The muscles were then imbricated in the midline with 0 Monocryl sutures.  The fascia  was repaired with 0 Vicryl in a running interlocking manner from side to middle.  Subcutaneous tissue was closed with 3-0 Monocryl in a running manner.  The skin was stapled closed    All layers were irrigated and inspected for hemostasis prior to the closure.  Final counts were correct.  A postoperative IVP with voiding cystogram will be performed       Complications:   None      Disposition:   Mother to Mother Baby/Postpartum  in stable condition currently.   Baby to NBN  in stable condition currently.       Enoc Fall MD  7/8/2019  8:39 AM          Electronically signed by Enoc Fall MD at 7/8/2019  8:46 AM

## 2019-07-09 NOTE — PLAN OF CARE
Problem: Patient Care Overview  Goal: Plan of Care Review  Outcome: Revised   1946   Coping/Psychosocial   Plan of Care Reviewed With patient   OTHER   Outcome Summary Pt breastfeeding feeding well & up at dina.       Problem: Postpartum ( Delivery) (Adult,Obstetrics,Pediatric)  Goal: Signs and Symptoms of Listed Potential Problems Will be Absent, Minimized or Managed (Postpartum)  Outcome: Ongoing (interventions implemented as appropriate)   19 0546   Goal/Outcome Evaluation   Problems Assessed (Postpartum ) all     Goal: Anesthesia/Sedation Recovery  Outcome: Ongoing (interventions implemented as appropriate)

## 2019-07-09 NOTE — LACTATION NOTE
This note was copied from a baby's chart.  Pt denies hx of breast surgery, no allergy to wool or foods. Medela gel patches provided. Instructed on use. She has a Spectra pump at home. BF her 5.5 y. o. X 15 mo. Teaching done. Baby in Nursery for testing, will call for feeding obs. declines bf DVD at this time.

## 2019-07-09 NOTE — PROGRESS NOTES
RICHARD Rudolph  Postpartum Note    Subjective   Postpartum Day 1:  Repeat Low Transverse  Section    Patient without complaints. Her pain is well controlled with nonsteroidal anti-inflammatory drugs and analgesics. She is ambulating well.  Patient describes her bleeding as thin lochia.    Breastfeeding: infant latching without difficulty.    Objective     Vitals:  Vitals:    19 2218 19 2300 19 0220 19 0800   BP: 113/74  96/61 93/60   BP Location: Left arm  Left arm Left arm   Patient Position: Sitting  Lying Lying   Pulse: 85  78 81   Resp: 18 18 16 17   Temp: 98.5 °F (36.9 °C)  98.9 °F (37.2 °C) 99 °F (37.2 °C)   TempSrc: Oral  Oral Oral   SpO2: 100% 94% 96% 98%   Weight:       Height:           Physical Exam:  General:  Alert and oriented x3. No acute distress.  Abdomen: abdomen is soft without significant tenderness, masses, organomegaly or guarding. Fundus: appropriate, firm, non tender  Incision: Bandage in Place   Skin: Warm, Dry  Extremities: Normal,  trace edema. Nontender     Labs:  Results from last 7 days   Lab Units 19  0455 19  0923   WBC 10*3/mm3 10.30 9.60   HEMOGLOBIN g/dL 11.1* 12.2   HEMATOCRIT % 34.2 36.4   PLATELETS 10*3/mm3 157 172            Feeding method: Breastfeeding Status: Yes     Blood Type: RH Positive        Assessment/Plan     Active Problems:    * No active hospital problems. *      Crystal Hodge is Day 1  post-partum from a  Repeat Low Transverse  Section      Plan:  encourage ambulation and monitor pain.   Rx: Ibuprofen PO  600mg Q6H/PRN for pain - patient states she able to have with ileostomy, disc with patient and RN    Carole Ghosh, NP  2019  8:24 AM

## 2019-07-10 LAB — T PALLIDUM IGG SER QL: NORMAL

## 2019-07-10 RX ADMIN — IBUPROFEN 600 MG: 600 TABLET, FILM COATED ORAL at 18:38

## 2019-07-10 RX ADMIN — OXYCODONE HYDROCHLORIDE 10 MG: 5 TABLET ORAL at 16:03

## 2019-07-10 RX ADMIN — HYDROCODONE BITARTRATE AND ACETAMINOPHEN 1 TABLET: 5; 325 TABLET ORAL at 01:39

## 2019-07-10 RX ADMIN — IBUPROFEN 600 MG: 600 TABLET, FILM COATED ORAL at 11:38

## 2019-07-10 RX ADMIN — HYDROCODONE BITARTRATE AND ACETAMINOPHEN 1 TABLET: 5; 325 TABLET ORAL at 09:53

## 2019-07-10 RX ADMIN — HYDROCODONE BITARTRATE AND ACETAMINOPHEN 1 TABLET: 5; 325 TABLET ORAL at 05:27

## 2019-07-10 RX ADMIN — OXYCODONE HYDROCHLORIDE 10 MG: 5 TABLET ORAL at 20:12

## 2019-07-10 RX ADMIN — IBUPROFEN 600 MG: 600 TABLET, FILM COATED ORAL at 05:27

## 2019-07-10 NOTE — LACTATION NOTE
This note was copied from a baby's chart.  Pt visited, baby brought in, assisted to attempt a feeding, baby disinterested at this time. Skin to skin encouraged and done, will call for help as needed. Bf DVD watched.

## 2019-07-10 NOTE — PLAN OF CARE
Problem: Patient Care Overview  Goal: Plan of Care Review  Outcome: Ongoing (interventions implemented as appropriate)   07/10/19 5186   Coping/Psychosocial   Plan of Care Reviewed With patient   OTHER   Outcome Summary patient ambulating well, patient's pain controlled well with motrin and oxycodone   Plan of Care Review   Progress improving     Goal: Individualization and Mutuality  Outcome: Ongoing (interventions implemented as appropriate)      Problem: Postpartum ( Delivery) (Adult,Obstetrics,Pediatric)  Goal: Signs and Symptoms of Listed Potential Problems Will be Absent, Minimized or Managed (Postpartum)  Outcome: Ongoing (interventions implemented as appropriate)

## 2019-07-10 NOTE — PLAN OF CARE
Problem: Patient Care Overview  Goal: Plan of Care Review  Outcome: Ongoing (interventions implemented as appropriate)   07/10/19 0605   Coping/Psychosocial   Plan of Care Reviewed With patient   OTHER   Outcome Summary Patient able to control pain with motrin and norco. Pt states that baby is breastfeeding well.    Plan of Care Review   Progress improving       Problem: Postpartum ( Delivery) (Adult,Obstetrics,Pediatric)  Goal: Signs and Symptoms of Listed Potential Problems Will be Absent, Minimized or Managed (Postpartum)  Outcome: Ongoing (interventions implemented as appropriate)    Goal: Anesthesia/Sedation Recovery  Outcome: Outcome(s) achieved Date Met: 07/10/19

## 2019-07-11 VITALS
BODY MASS INDEX: 27.73 KG/M2 | HEIGHT: 63 IN | SYSTOLIC BLOOD PRESSURE: 116 MMHG | HEART RATE: 78 BPM | DIASTOLIC BLOOD PRESSURE: 74 MMHG | RESPIRATION RATE: 18 BRPM | OXYGEN SATURATION: 99 % | WEIGHT: 156.53 LBS | TEMPERATURE: 98.4 F

## 2019-07-11 RX ORDER — OXYCODONE HYDROCHLORIDE AND ACETAMINOPHEN 5; 325 MG/1; MG/1
1-2 TABLET ORAL EVERY 4 HOURS PRN
Qty: 20 TABLET | Refills: 0 | Status: SHIPPED | OUTPATIENT
Start: 2019-07-11 | End: 2022-01-17

## 2019-07-11 RX ORDER — IBUPROFEN 600 MG/1
600 TABLET ORAL EVERY 6 HOURS PRN
Qty: 30 TABLET | Refills: 0 | Status: SHIPPED | OUTPATIENT
Start: 2019-07-11

## 2019-07-11 RX ADMIN — IBUPROFEN 600 MG: 600 TABLET, FILM COATED ORAL at 01:44

## 2019-07-11 RX ADMIN — OXYCODONE HYDROCHLORIDE 10 MG: 5 TABLET ORAL at 10:29

## 2019-07-11 RX ADMIN — OXYCODONE HYDROCHLORIDE 10 MG: 5 TABLET ORAL at 01:44

## 2019-07-11 RX ADMIN — IBUPROFEN 600 MG: 600 TABLET, FILM COATED ORAL at 10:28

## 2019-07-11 NOTE — DISCHARGE SUMMARY
[facility]  Delivery Discharge Summary    Primary OB Clinician: Enoc Fall MD    Admission Diagnosis: TIUP; Prev CS  Active Problems:    * No active hospital problems. *      Discharge Diagnosis:  Same; delivered    Gestational Age: 39w2d    Date of Delivery: 2019     Delivered By:  Enoc Fall     Delivery Type: , Low Transverse      Tubal Ligation: n/a    Intrapartum Course: Uncomplicated delivery.     Postpartum Course:  Pt was admitted and underwent  Repeat Low Transverse  Section. Pt was transferred to PP where she had an uncomplicated course. Pt remained AFVSS, had scant lochia and pain was well controlled. Pt d/c home in stable condition and will f/u in office for PP visit as scheduled or PRN. Currently breastfeeding. Plans on condoms  for contraception.     Physical Exam:    Vitals:   Vitals:    07/10/19 0819 07/10/19 1423 07/10/19 2240 19 0755   BP: 97/68 102/67 110/75 116/74   BP Location: Left arm Left arm Left arm Left arm   Patient Position: Sitting Lying Sitting Sitting   Pulse: 80 87 86 78   Resp: 17 17 18 18   Temp: 98 °F (36.7 °C) 98.4 °F (36.9 °C) 98.1 °F (36.7 °C) 98.4 °F (36.9 °C)   TempSrc: Oral Oral Oral Oral   SpO2: 99% 97% 96% 99%   Weight:       Height:         Temp (24hrs), Av.3 °F (36.8 °C), Min:98.1 °F (36.7 °C), Max:98.4 °F (36.9 °C)      General Appearance:    Alert, cooperative, in no acute distress   Abdomen:     Soft non-tender, non-distended, no guarding, no rebound         tenderness.   Extremities:   Moves all extremities well, no edema, no cyanosis, no              Redness.   Incision:  Clean/Dry/Intact and Staples intact   Fundus:   Firm, below umbilicus     Feeding method: Breastfeeding Status: Yes    Labs:  Results from last 7 days   Lab Units 19  0455 19  0923   WBC 10*3/mm3 10.30 9.60   HEMOGLOBIN g/dL 11.1* 12.2   HEMATOCRIT % 34.2 36.4   PLATELETS 10*3/mm3 157 172           Blood Type: RH Positive      Plan:  Discharge  to home.    Follow-up appointment with Dr Fall in 6 weeks.  F/u in office for staple removal.     ADAMS Mahajan  7/11/2019  2:27 PM

## 2019-07-11 NOTE — LACTATION NOTE
This note was copied from a baby's chart.  Pt assisted to wake, position, latch wide, feed baby well, bilat. Breasts engorged, softer after feeding, demo gentle massage while feeding. Ice pack provided pc application. Teaching complete. Plans d/C today will follow up as needed.

## 2019-07-11 NOTE — PLAN OF CARE
Problem: Patient Care Overview  Goal: Plan of Care Review  Outcome: Ongoing (interventions implemented as appropriate)   19 0634   Coping/Psychosocial   Plan of Care Reviewed With patient   OTHER   Outcome Summary patient up ambulating/voiding well. pt pain controlled well with oral pain meds. pt is breastfeeding well.    Plan of Care Review   Progress improving     Goal: Individualization and Mutuality  Outcome: Ongoing (interventions implemented as appropriate)      Problem: Postpartum ( Delivery) (Adult,Obstetrics,Pediatric)  Goal: Signs and Symptoms of Listed Potential Problems Will be Absent, Minimized or Managed (Postpartum)  Outcome: Ongoing (interventions implemented as appropriate)

## 2019-07-12 NOTE — PAYOR COMM NOTE
"Discharge notice        RETURN CONTACT  VIANEY VILLAGOMEZ RN   [facility]   U.R. /    PH: [phone]  FX: [phone]      Crystal Randolph (34 y.o. Female)     Date of Birth Social Security Number Address Home Phone MRN    1985  [id number] [place] [address] [phone] [id number]    Mosque Marital Status          None        Admission Date Admission Type Admitting Provider Attending Provider Department, Room/Bed    19 Elective Enoc Fall MD  [facility] MOTHER BABY, M418/1    Discharge Date Discharge Disposition Discharge Destination        2019 Home or Self Care              Attending Provider:  (none)   Allergies:  Remicade [Infliximab]    Isolation:  None   Infection:  None   Code Status:  Prior    Ht:  160 cm (63\")   Wt:  71 kg (156 lb 8.4 oz)    Admission Cmt:  None   Principal Problem:  None                Active Insurance as of 2019     Primary Coverage     Payor Plan Insurance Group Employer/Plan Group    [facility] [id number]     Payor Plan Address Payor Plan Phone Number Payor Plan Fax Number Effective Dates    [address]   2018 - None Entered    [facility] [id number]       Subscriber Name Subscriber Birth Date Member ID       JIMMY RANDOLPH 1984 [id number]                 Emergency Contacts      (Rel.) Home Phone Work Phone Mobile Phone    Jimmy Randolph (Spouse) [phone] -- [phone]               Discharge Summary      Jazzy Nuñez APRN at 2019  2:26 PM           Ronni  Delivery Discharge Summary    Primary OB Clinician: Enoc Fall MD    Admission Diagnosis: TIUP; Prev CS  Active Problems:    * No active hospital problems. *      Discharge Diagnosis:  Same; delivered    Gestational Age: 39w2d    Date of Delivery: 2019     Delivered By:  Enoc Fall     Delivery Type: , Low Transverse      Tubal Ligation: " n/a    Intrapartum Course: Uncomplicated delivery.     Postpartum Course:  Pt was admitted and underwent  Repeat Low Transverse  Section. Pt was transferred to PP where she had an uncomplicated course. Pt remained AFVSS, had scant lochia and pain was well controlled. Pt d/c home in stable condition and will f/u in office for PP visit as scheduled or PRN. Currently breastfeeding. Plans on condoms  for contraception.     Physical Exam:    Vitals:   Vitals:    07/10/19 0819 07/10/19 1423 07/10/19 2240 19 0755   BP: 97/68 102/67 110/75 116/74   BP Location: Left arm Left arm Left arm Left arm   Patient Position: Sitting Lying Sitting Sitting   Pulse: 80 87 86 78   Resp: 17 17 18 18   Temp: 98 °F (36.7 °C) 98.4 °F (36.9 °C) 98.1 °F (36.7 °C) 98.4 °F (36.9 °C)   TempSrc: Oral Oral Oral Oral   SpO2: 99% 97% 96% 99%   Weight:       Height:         Temp (24hrs), Av.3 °F (36.8 °C), Min:98.1 °F (36.7 °C), Max:98.4 °F (36.9 °C)      General Appearance:    Alert, cooperative, in no acute distress   Abdomen:     Soft non-tender, non-distended, no guarding, no rebound         tenderness.   Extremities:   Moves all extremities well, no edema, no cyanosis, no              Redness.   Incision:  Clean/Dry/Intact and Staples intact   Fundus:   Firm, below umbilicus     Feeding method: Breastfeeding Status: Yes    Labs:  Results from last 7 days   Lab Units 19  0455 19  0923   WBC 10*3/mm3 10.30 9.60   HEMOGLOBIN g/dL 11.1* 12.2   HEMATOCRIT % 34.2 36.4   PLATELETS 10*3/mm3 157 172           Blood Type: RH Positive      Plan:  Discharge to home.    Follow-up appointment with Dr Fall in 6 weeks.  F/u in office for staple removal.     ADAMS Mahajan  2019  2:27 PM      Electronically signed by Jazzy Nuñez APRN at 2019  2:30 PM

## 2022-02-17 ENCOUNTER — OFFICE VISIT (OUTPATIENT)
Dept: FAMILY MEDICINE CLINIC | Facility: CLINIC | Age: 37
End: 2022-02-17

## 2022-02-17 VITALS
HEIGHT: 64 IN | DIASTOLIC BLOOD PRESSURE: 80 MMHG | BODY MASS INDEX: 21.85 KG/M2 | OXYGEN SATURATION: 96 % | TEMPERATURE: 99.3 F | HEART RATE: 85 BPM | WEIGHT: 128 LBS | SYSTOLIC BLOOD PRESSURE: 114 MMHG

## 2022-02-17 DIAGNOSIS — R05.3 PERSISTENT COUGH: Primary | ICD-10-CM

## 2022-02-17 DIAGNOSIS — Z86.16 PERSONAL HISTORY OF COVID-19: ICD-10-CM

## 2022-02-17 PROBLEM — I83.90 VARICOSE VEINS OF LOWER EXTREMITY: Status: ACTIVE | Noted: 2020-08-28

## 2022-02-17 PROBLEM — Z43.2 ILEOSTOMY CARE (HCC): Status: ACTIVE | Noted: 2017-03-23

## 2022-02-17 PROBLEM — K62.4 STRICTURE OF ANUS: Status: ACTIVE | Noted: 2021-09-05

## 2022-02-17 PROCEDURE — 99203 OFFICE O/P NEW LOW 30 MIN: CPT | Performed by: FAMILY MEDICINE

## 2022-02-17 RX ORDER — MULTIVIT WITH MINERALS/LUTEIN
250 TABLET ORAL DAILY
COMMUNITY

## 2022-02-17 RX ORDER — CEPHALEXIN 500 MG/1
500 CAPSULE ORAL 2 TIMES DAILY
Qty: 20 CAPSULE | Refills: 0 | Status: SHIPPED | OUTPATIENT
Start: 2022-02-17 | End: 2022-06-02

## 2022-02-17 RX ORDER — MELATONIN
1000 DAILY
COMMUNITY

## 2022-02-17 NOTE — PROGRESS NOTES
"Subjective   Crystal Hodge is a 37 y.o. female.     History of Present Illness     The patient presents to re-[facility] care. She also has a persistent cough since she was diagnosed with COVID-19 on 01/09/2022.     Crohn's disease.  The patient states that she did take Cimzia temporarily in 2020. After she had her daughter in 2019 she started having bilateral knee swelling, and perianal abscesses. She stopped taking it in 12/2020 due to getting COVID-19. She was only supposed to be off of it for a month, but stopped taking it all together. She states that her symptoms resolved and she no longer needed to take Cimzia. Her Colorectal surgeon is aware she is not taking the medication. She has not had any joint issues or abscesses in a year.     COVID-19  She first came down with COVID-19 in 12/2020. She developed symptoms again 13 months later on 01/09/2022. She reports that the first week she only felt achy and tired, but the second week she developed chest symptoms. She went to urgent care, where a chest x-ray was performed and was told she had developed bronchitis. She was prescribed doxycycline and prednisone, as well as a rescue inhaler. Her symptoms did resolve somewhat, but came back once she stopped taking prednisone and doxycycline. She was using her rescue inhaler twice a week, and coughing up phlegm. She went back to urgent care last week and another chest x-ray was performed, which demonstrated no acute abnormalities. She was prescribed another course of prednisone, as well as Tessalon pearls and Singulair. She was told she may have developed temporary asthma secondary to the COVID-19. She was told to follow-up with a primary care physician. Today the patient states that she still has a productive cough, especially at night. She feels as if she has, \"gunk in my chest,\". She does wheeze and sometimes feels short of breath. She also reports stabbing pain along her right posterior thorax when she coughs. She " denies feeling as if she will pass out. She states that the Tessalon pearls have not been managing her cough at all and she is still using the rescue inhaler twice daily.     Vaccines.  The patient is up to date for her influenza vaccine, as well as tetanus and whopping cough. She has not and is not interested in getting the COVID-19 vaccine at this time.     Medical history.   She has 3 children. Her last visit with an OBGYN was in 2019 during her pregnancy.     The following portions of the patient's history were reviewed and updated as appropriate: allergies, current medications, past family history, past medical history, past social history, past surgical history, and problem list.  Past Medical History:   Diagnosis Date   • Crohn disease (HCC)      Past Surgical History:   Procedure Laterality Date   •  SECTION N/A 2019    Procedure:  SECTION PRIMARY;  Surgeon: Enoc Fall MD;  Location: [facility] LABOR DELIVERY;  Service: Gynecology   •  SECTION PRIMARY     • COLON RESECTION      no large bowel     History reviewed. No pertinent family history.  Social History     Socioeconomic History   • Marital status:    Tobacco Use   • Smoking status: Never Smoker   • Smokeless tobacco: Never Used   Vaping Use   • Vaping Use: Never used   Substance and Sexual Activity   • Alcohol use: No   • Drug use: No   • Sexual activity: Yes     Partners: Male         Current Outpatient Medications:   •  albuterol sulfate  (90 Base) MCG/ACT inhaler, Inhale 2 puffs Every 4 (Four) Hours As Needed for Wheezing., Disp: 8 g, Rfl: 0  •  benzonatate (TESSALON) 200 MG capsule, Take 1 capsule by mouth 3 (Three) Times a Day As Needed for Cough for up to 10 days., Disp: 30 capsule, Rfl: 0  •  cholecalciferol (VITAMIN D3) 25 MCG (1000 UT) tablet, Take 1,000 Units by mouth Daily., Disp: , Rfl:   •  guaiFENesin (MUCINEX) 600 MG 12 hr tablet, Take 1,200 mg by mouth 2 (Two) Times a Day., Disp: , Rfl:   •  " loperamide (LOPERAMIDE A-D) 2 MG tablet, Take 2 mg by mouth 4 (Four) Times a Day As Needed for Diarrhea., Disp: , Rfl:   •  montelukast (SINGULAIR) 10 MG tablet, Take 1 tablet by mouth Every Night., Disp: 30 tablet, Rfl: 0  •  Prenatal Vit-Fe Fumarate-FA (CLASSIC PRENATAL PO), Take 1 tablet by mouth Daily., Disp: , Rfl:   •  vitamin C (ASCORBIC ACID) 250 MG tablet, Take 250 mg by mouth Daily., Disp: , Rfl:   •  cephalexin (Keflex) 500 MG capsule, Take 1 capsule by mouth 2 (Two) Times a Day., Disp: 20 capsule, Rfl: 0  •  fluticasone-salmeterol (Advair Diskus) 100-50 MCG/DOSE DISKUS, Inhale 1 puff 2 (Two) Times a Day., Disp: 60 each, Rfl: 1  •  ibuprofen (ADVIL,MOTRIN) 600 MG tablet, Take 1 tablet by mouth Every 6 (Six) Hours As Needed for Mild Pain ., Disp: 30 tablet, Rfl: 0    Review of Systems  /80 (BP Location: Left arm, Patient Position: Sitting, Cuff Size: Adult)   Pulse 85   Temp 99.3 °F (37.4 °C) (Temporal)   Ht 162.6 cm (64\")   Wt 58.1 kg (128 lb)   LMP 01/25/2022 (Approximate)   SpO2 96%   Breastfeeding No   BMI 21.97 kg/m²       Objective   Physical Exam  Vitals and nursing note reviewed.   Constitutional:       General: She is not in acute distress.     Appearance: She is well-developed.   HENT:      Head: Normocephalic and atraumatic.   Neck:      Thyroid: No thyromegaly.   Cardiovascular:      Rate and Rhythm: Normal rate and regular rhythm.      Heart sounds: Normal heart sounds. No murmur heard.  No friction rub. No gallop.    Pulmonary:      Effort: Pulmonary effort is normal. No respiratory distress.      Breath sounds: Wheezing present. No rales.      Comments:  Faint expiratory wheeze. Persistent recurring cough.   Musculoskeletal:      Cervical back: Neck supple.      Right lower leg: No edema.      Left lower leg: No edema.      Comments: No reproducible pain over the posterior thorax.   Lymphadenopathy:      Cervical: No cervical adenopathy.   Skin:     General: Skin is warm and " dry.   Neurological:      Mental Status: She is alert.           Assessment/Plan   Problems Addressed this Visit        Pulmonary and Pneumonias    Persistent cough - Primary     - Persistent cough status post COVID-19 in 01/2022. She has already had 2 rounds of steroids and 1 round of antibiotics. She has an albuterol inhaler and she has Singulair. I will add Advair 100/50 for her to use on a consistent basis and I will go ahead and add Keflex since the cough is productive. If her symptoms are not improving, she will let me know and may have to consider pulmonary function testing. Right now both the x-ray, chest x-ray from 01/2022 and the chest x-ray from 02/2022 are normal.           Other Visit Diagnoses     Personal history of COVID-19          Diagnoses       Codes Comments    Persistent cough    -  Primary ICD-10-CM: R05.3  ICD-9-CM: 786.2     Personal history of COVID-19     ICD-10-CM: Z86.16  ICD-9-CM: V12.09

## 2022-02-18 NOTE — ASSESSMENT & PLAN NOTE
- Persistent cough status post COVID-19 in 01/2022. She has already had 2 rounds of steroids and 1 round of antibiotics. She has an albuterol inhaler and she has Singulair. I will add Advair 100/50 for her to use on a consistent basis and I will go ahead and add Keflex since the cough is productive. If her symptoms are not improving, she will let me know and may have to consider pulmonary function testing. Right now both the x-ray, chest x-ray from 01/2022 and the chest x-ray from 02/2022 are normal.

## 2024-03-06 ENCOUNTER — OFFICE VISIT (OUTPATIENT)
Dept: FAMILY MEDICINE CLINIC | Facility: CLINIC | Age: 39
End: 2024-03-06
Payer: COMMERCIAL

## 2024-03-06 VITALS
BODY MASS INDEX: 21.62 KG/M2 | HEART RATE: 86 BPM | TEMPERATURE: 97.8 F | DIASTOLIC BLOOD PRESSURE: 82 MMHG | HEIGHT: 63 IN | SYSTOLIC BLOOD PRESSURE: 115 MMHG | WEIGHT: 122 LBS | OXYGEN SATURATION: 99 %

## 2024-03-06 DIAGNOSIS — R53.1 STRENGTH LOSS OF: ICD-10-CM

## 2024-03-06 DIAGNOSIS — R20.2 PARESTHESIAS: ICD-10-CM

## 2024-03-06 DIAGNOSIS — M79.641 BILATERAL HAND PAIN: ICD-10-CM

## 2024-03-06 DIAGNOSIS — M06.9 RHEUMATOID ARTHRITIS INVOLVING MULTIPLE SITES, UNSPECIFIED WHETHER RHEUMATOID FACTOR PRESENT: Primary | ICD-10-CM

## 2024-03-06 DIAGNOSIS — M79.642 BILATERAL HAND PAIN: ICD-10-CM

## 2024-03-06 DIAGNOSIS — M25.561 RIGHT KNEE PAIN, UNSPECIFIED CHRONICITY: ICD-10-CM

## 2024-03-06 PROCEDURE — 99214 OFFICE O/P EST MOD 30 MIN: CPT | Performed by: FAMILY MEDICINE

## 2024-03-06 RX ORDER — CERTOLIZUMAB PEGOL 400 MG
KIT SUBCUTANEOUS
COMMUNITY

## 2024-03-06 RX ORDER — ACETAMINOPHEN 500 MG
1000 TABLET ORAL DAILY
COMMUNITY

## 2024-03-06 RX ORDER — SODIUM PHOSPHATE,MONO-DIBASIC 19G-7G/118
ENEMA (ML) RECTAL
COMMUNITY

## 2024-03-06 NOTE — PROGRESS NOTES
Subjective   Ying Randolph is a 39 y.o. female.     History of Present Illness     YING RANDOLPH her YOB: 1986, she is a 38-year-old female who comes in with complaints of hand pain and knee pain.    She started seeing rheumatology in 10/2023 and they started her on Cimzia. She had gone to her rheumatologist in 2019 and 2020 for random issues with her knee swelling, but that resolved and she had not had any issues for a long time. Suddenly, in 09/2023, her hands started swelling, specifically her right hand and her joints were very painful. She went back to her rheumatologist who diagnosed her with rheumatoid arthritis and started her on Cimzia in 11/2023. She has been on 3 injections every 2 weeks. She is not sure if her rheumatoid factor was positive. Her CRP was 22. She has Crohn's disease and sees Dr. Ricks. In the past, when her Crohn's flared up, she would have sore joints, but it was never swollen in her hands. Dr. Ricks put her on prednisone tapers a few times because she was in so much discomfort that she could hardly use her hands or work. She has tried turmeric, hot teas, anti-inflammatory stuff, glucosamine and chondroitin. She has been taking Tylenol and ibuprofen around the clock since 10/2023 because of her hands and right knee. She has complete body aches all over and she can hardly get out of bed. Her  has to help pull her out of bed every morning because she is in so much discomfort and stiffness. She has to rub Icy Hot all over her and has to use a heating pad for 15 minutes before she can even move enough. She has shooting pain that radiates down her hand and sometimes up towards her elbow. Lately, when she is driving, the pain radiates up her right calf. Her rheumatologist told her that her joints are swollen, it could be compressing her nerve. She asked her rheumatologist if there was something else that she could do for pain management. Her rheumatologist wanted  her to get off the ibuprofen because of her stomach ulcers and Crohn's. She is still on the steroid taper that Dr. Ricks had her do. She is on 5 mg for 1 more week. Her rheumatologist damion labs on her again 2 weeks ago and her CRP level is now almost normal. She had x-rays to see if she had osteoarthritis, which were normal. She has weakness in her hands and drops things all the time. Her  has to grab the food out of the oven for her. Sometimes she can not grab and hold a gallon of milk. She is fatigued when she gets home from work.        Past Medical History:   Diagnosis Date    COVID 2022    Crohn disease      Past Surgical History:   Procedure Laterality Date     SECTION N/A 2019    Procedure:  SECTION PRIMARY;  Surgeon: Enoc Fall MD;  Location: [facility] LABOR DELIVERY;  Service: Gynecology     SECTION PRIMARY      COLON RESECTION      no large bowel     History reviewed. No pertinent family history.  Social History     Socioeconomic History    Marital status:    Tobacco Use    Smoking status: Never     Passive exposure: Never    Smokeless tobacco: Never   Vaping Use    Vaping status: Never Used   Substance and Sexual Activity    Alcohol use: No    Drug use: No    Sexual activity: Yes     Partners: Male         Current Outpatient Medications:     acetaminophen (TYLENOL) 500 MG tablet, Take 2 tablets by mouth Daily., Disp: , Rfl:     albuterol sulfate  (90 Base) MCG/ACT inhaler, Inhale 2 puffs Every 4 (Four) Hours As Needed for Wheezing., Disp: 8 g, Rfl: 0    Certolizumab Pegol (Cimzia) 2 X 200 MG kit, Inject  under the skin into the appropriate area as directed Every 30 (Thirty) Days., Disp: , Rfl:     cholecalciferol (VITAMIN D3) 25 MCG (1000 UT) tablet, Take 1 tablet by mouth Daily., Disp: , Rfl:     glucosamine-chondroitin 500-400 MG capsule capsule, Take  by mouth 3 (Three) Times a Day With Meals., Disp: , Rfl:     ibuprofen (ADVIL,MOTRIN) 600  "MG tablet, Take 1 tablet by mouth Every 6 (Six) Hours As Needed for Mild Pain ., Disp: 30 tablet, Rfl: 0    loperamide (LOPERAMIDE A-D) 2 MG tablet, Take 1 tablet by mouth 4 (Four) Times a Day As Needed for Diarrhea., Disp: , Rfl:     multivitamin (MULTIPLE VITAMIN PO), Take  by mouth., Disp: , Rfl:     TURMERIC PO, Take 2 each by mouth Daily., Disp: , Rfl:     vitamin C (ASCORBIC ACID) 250 MG tablet, Take 1 tablet by mouth Daily., Disp: , Rfl:     Review of Systems    A review of systems was performed, and the pertinent positives are noted in the HPI.    /82 (BP Location: Left arm, Patient Position: Sitting, Cuff Size: Adult)   Pulse 86   Temp 97.8 °F (36.6 °C) (Temporal)   Ht 160 cm (63\")   Wt 55.3 kg (122 lb)   SpO2 99%   Breastfeeding No   BMI 21.61 kg/m²   BMI is within normal parameters. No other follow-up for BMI required.       Objective   Physical Exam  Vitals and nursing note reviewed.   Constitutional:       Appearance: Normal appearance. She is normal weight.   Cardiovascular:      Rate and Rhythm: Normal rate and regular rhythm.      Heart sounds: Normal heart sounds.   Pulmonary:      Effort: Pulmonary effort is normal.      Breath sounds: Normal breath sounds.   Musculoskeletal:      Right hand: Normal.      Left hand: Normal.      Right knee: Normal.   Neurological:      Mental Status: She is alert.      Comments:  strength 4/5 on right and 5/5 on left   Psychiatric:         Mood and Affect: Affect is tearful.           Assessment & Plan   Problems Addressed this Visit          Musculoskeletal and Injuries    Rheumatoid arthritis involving multiple sites - Primary     Other Visit Diagnoses       Paresthesias        Relevant Orders    EMG & Nerve Conduction Test    Strength loss of        Relevant Orders    EMG & Nerve Conduction Test    Bilateral hand pain        Right knee pain, unspecified chronicity              Diagnoses         Codes Comments    Rheumatoid arthritis involving " multiple sites, unspecified whether rheumatoid factor present    -  Primary ICD-10-CM: M06.9  ICD-9-CM: 714.0     Paresthesias     ICD-10-CM: R20.2  ICD-9-CM: 782.0     Strength loss of     ICD-10-CM: R53.1  ICD-9-CM: 780.79     Bilateral hand pain     ICD-10-CM: M79.641, M79.642  ICD-9-CM: 729.5     Right knee pain, unspecified chronicity     ICD-10-CM: M25.561  ICD-9-CM: 719.46             1. Rheumatoid arthritis involving multiple sites.  She is on Cimzia and seeing rheumatology.    2. Paresthesias and loss of strength.  I am ordering an EMG be done of her right lower extremity and right upper extremity. 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    3. Bilateral hand pain and right knee pain.  X-rays were done yesterday, 03/05/2024, and were normal. She will continue her follow-up with rheumatology and with the surgeon who follows her.    4. Inflammatory bowel disease.  She is currently on a steroid taper and will continue to finish that. I have encouraged her to try to switch over to Tylenol instead of taking the ibuprofen so that she can get pain relief and not affect her inflammatory bowel disease. I may need to consider something like gabapentin depending upon the results of these studies.    Follow-up  The patient will follow up as needed.               Transcribed from ambient dictation for Alfreda West MD by Nisa Flores.  03/06/24   16:44 EST    Patient or patient representative verbalized consent to the visit recording.  I have personally performed the services described in this document as transcribed by the above individual, and it is both accurate and complete.

## 2024-09-26 NOTE — PROGRESS NOTES
Case Management Discharge Note                Final Discharge Disposition Code: 01 - home or self-care   Render Risk Assessment In Note?: no Additional Notes: Pink scar without keloid Detail Level: Simple

## (undated) DEVICE — SPNG LAP PREWSH SFTPK 18X18IN STRL PK/5

## (undated) DEVICE — VIOLET BRAIDED (POLYGLACTIN 910), SYNTHETIC ABSORBABLE SUTURE: Brand: COATED VICRYL

## (undated) DEVICE — SUT MNCRYL 3/0 CT1 36 IN Y944H

## (undated) DEVICE — SUT MNCRYL 0 CT 36IN

## (undated) DEVICE — DRSNG WND BORDR/ADHS NONADHR/GZ LF 4X10IN STRL

## (undated) DEVICE — SOL IRRIG H2O 1000ML STRL

## (undated) DEVICE — PK C SECT 50

## (undated) DEVICE — SOL IRRIG NACL 9PCT 1000ML BTL

## (undated) DEVICE — TRY SADDLE BLCK 25G

## (undated) DEVICE — GLV SURG SENSICARE MICRO PF LF 7.5 STRL